# Patient Record
Sex: FEMALE | Race: WHITE
[De-identification: names, ages, dates, MRNs, and addresses within clinical notes are randomized per-mention and may not be internally consistent; named-entity substitution may affect disease eponyms.]

---

## 2019-11-19 ENCOUNTER — HOSPITAL ENCOUNTER (EMERGENCY)
Dept: HOSPITAL 25 - ED | Age: 64
Discharge: HOME | End: 2019-11-19
Payer: COMMERCIAL

## 2019-11-19 VITALS — DIASTOLIC BLOOD PRESSURE: 79 MMHG | SYSTOLIC BLOOD PRESSURE: 117 MMHG

## 2019-11-19 DIAGNOSIS — R55: Primary | ICD-10-CM

## 2019-11-19 DIAGNOSIS — I10: ICD-10-CM

## 2019-11-19 DIAGNOSIS — Z88.2: ICD-10-CM

## 2019-11-19 DIAGNOSIS — E78.00: ICD-10-CM

## 2019-11-19 DIAGNOSIS — Z90.710: ICD-10-CM

## 2019-11-19 DIAGNOSIS — Z87.891: ICD-10-CM

## 2019-11-19 LAB
ALBUMIN SERPL BCG-MCNC: 4.1 G/DL (ref 3.2–5.2)
ALBUMIN/GLOB SERPL: 1.4 {RATIO} (ref 1–3)
ALP SERPL-CCNC: 50 U/L (ref 34–104)
ALT SERPL W P-5'-P-CCNC: 18 U/L (ref 7–52)
ANION GAP SERPL CALC-SCNC: 9 MMOL/L (ref 2–11)
AST SERPL-CCNC: 20 U/L (ref 13–39)
BASOPHILS # BLD AUTO: 0 10^3/UL (ref 0–0.2)
BUN SERPL-MCNC: 16 MG/DL (ref 6–24)
BUN/CREAT SERPL: 17 (ref 8–20)
CALCIUM SERPL-MCNC: 9.4 MG/DL (ref 8.6–10.3)
CHLORIDE SERPL-SCNC: 98 MMOL/L (ref 101–111)
EOSINOPHIL # BLD AUTO: 0.1 10^3/UL (ref 0–0.6)
GLOBULIN SER CALC-MCNC: 2.9 G/DL (ref 2–4)
GLUCOSE SERPL-MCNC: 122 MG/DL (ref 70–100)
HCO3 SERPL-SCNC: 25 MMOL/L (ref 22–32)
HCT VFR BLD AUTO: 38 % (ref 35–47)
HGB BLD-MCNC: 13 G/DL (ref 12–16)
INR PPP/BLD: 0.89 (ref 0.82–1.09)
LYMPHOCYTES # BLD AUTO: 1 10^3/UL (ref 1–4.8)
MAGNESIUM SERPL-MCNC: 1.7 MG/DL (ref 1.9–2.7)
MCH RBC QN AUTO: 32 PG (ref 27–31)
MCHC RBC AUTO-ENTMCNC: 34 G/DL (ref 31–36)
MCV RBC AUTO: 94 FL (ref 80–97)
MONOCYTES # BLD AUTO: 0.6 10^3/UL (ref 0–0.8)
NEUTROPHILS # BLD AUTO: 5.3 10^3/UL (ref 1.5–7.7)
NRBC # BLD AUTO: 0 10^3/UL
NRBC BLD QL AUTO: 0
PLATELET # BLD AUTO: 278 10^3/UL (ref 150–450)
POTASSIUM SERPL-SCNC: 4.2 MMOL/L (ref 3.5–5)
PROT SERPL-MCNC: 7 G/DL (ref 6.4–8.9)
RBC # BLD AUTO: 4.08 10^6 /UL (ref 3.7–4.87)
SODIUM SERPL-SCNC: 132 MMOL/L (ref 135–145)
TROPONIN I SERPL-MCNC: 0 NG/ML (ref ?–0.04)
TSH SERPL-ACNC: 2.31 MCIU/ML (ref 0.34–5.6)
WBC # BLD AUTO: 7 10^3/UL (ref 3.5–10.8)
WBC UR QL AUTO: (no result)

## 2019-11-19 PROCEDURE — 85025 COMPLETE CBC W/AUTO DIFF WBC: CPT

## 2019-11-19 PROCEDURE — 99283 EMERGENCY DEPT VISIT LOW MDM: CPT

## 2019-11-19 PROCEDURE — 84484 ASSAY OF TROPONIN QUANT: CPT

## 2019-11-19 PROCEDURE — 81015 MICROSCOPIC EXAM OF URINE: CPT

## 2019-11-19 PROCEDURE — 84443 ASSAY THYROID STIM HORMONE: CPT

## 2019-11-19 PROCEDURE — 85610 PROTHROMBIN TIME: CPT

## 2019-11-19 PROCEDURE — 80053 COMPREHEN METABOLIC PANEL: CPT

## 2019-11-19 PROCEDURE — 87077 CULTURE AEROBIC IDENTIFY: CPT

## 2019-11-19 PROCEDURE — 83735 ASSAY OF MAGNESIUM: CPT

## 2019-11-19 PROCEDURE — 83605 ASSAY OF LACTIC ACID: CPT

## 2019-11-19 PROCEDURE — 81003 URINALYSIS AUTO W/O SCOPE: CPT

## 2019-11-19 PROCEDURE — 87186 SC STD MICRODIL/AGAR DIL: CPT

## 2019-11-19 PROCEDURE — 71045 X-RAY EXAM CHEST 1 VIEW: CPT

## 2019-11-19 PROCEDURE — 83880 ASSAY OF NATRIURETIC PEPTIDE: CPT

## 2019-11-19 PROCEDURE — 93005 ELECTROCARDIOGRAM TRACING: CPT

## 2019-11-19 PROCEDURE — 87086 URINE CULTURE/COLONY COUNT: CPT

## 2019-11-19 PROCEDURE — 96361 HYDRATE IV INFUSION ADD-ON: CPT

## 2019-11-19 PROCEDURE — 36415 COLL VENOUS BLD VENIPUNCTURE: CPT

## 2019-11-19 PROCEDURE — 96375 TX/PRO/DX INJ NEW DRUG ADDON: CPT

## 2019-11-19 PROCEDURE — 96374 THER/PROPH/DIAG INJ IV PUSH: CPT

## 2019-11-19 PROCEDURE — 70450 CT HEAD/BRAIN W/O DYE: CPT

## 2019-11-19 NOTE — ED
Syncope/Near Syncope





- HPI Summary


HPI Summary: 





This patient is a 64 year old F presenting to [facility] accompanied by her daughter 

with a chief complaint of syncope since 2 hours ago. Pt was urinating on the 

toilet when she woke up on the floor. She was slurring words once she was 

awake. Pt does not have any memory of the syncope. The patient rates the pain 5/

10 in severity. Symptoms aggravated by nothing. Symptoms alleviated by nothing. 

Patient reports HA, tingling and numbness of hands and feet, leg cramps


Patient denies CP, SOB. Pt has HTN.


 





- History Of Current Complaint


Chief Complaint: EDSyncope


Time Seen by Provider: 11/19/19 02:41


Hx Obtained From: Patient


Onset/Duration: Sudden Onset, Lasting Hours - 2, Still Present


Timing: Hours - 2


Activity At Onset: Other - urinating on toilet


Associated Head Trauma: No


Aggravating Factor(s): Nothing


Alleviating Factor(s): Nothing


Associated Signs And Symptoms: Other - positive - syncope, HA, tingling and 

numbness of hands and feet, leg cramps. negative - CP, SOB.





- Allergies/Home Medications


Allergies/Adverse Reactions: 


 Allergies











Allergy/AdvReac Type Severity Reaction Status Date / Time


 


MS Sulfa Antibiotics Allergy  Hives Verified 12/30/15 10:18





[Sulfa Antibiotics]     











Home Medications: 


 Home Medications





Zoloft* 25 mg PO DAILY 11/19/19 [History Confirmed 11/19/19]











PMH/Surg Hx/FS Hx/Imm Hx


Previously Healthy: No


Endocrine/Hematology History: 


   Denies: Hx Diabetes


Cardiovascular History: Reports: Hx Angina, Hx Hypercholesterolemia, Hx 

Hypertension


   Denies: Hx Coronary Artery Disease, Hx Myocardial Infarction, Hx Pacemaker/

ICD


Respiratory History: 


   Denies: Hx Asthma, Hx Chronic Obstructive Pulmonary Disease (COPD)


 History: 


   Denies: Hx Renal Disease


Sensory History: 


   Denies: Hx Hearing Aid


Psychiatric History: 


   Denies: Hx Panic Disorder





- Cancer History


Hx Chemotherapy: No


Hx Radiation Therapy: No





- Surgical History


Surgical History: Yes


Surgery Procedure, Year, and Place: HYSTERECTOMY - PRIOR -TUBAL LIGATION.  

APPENDECTOMY


Infectious Disease History: No


Infectious Disease History: 


   Denies: Traveled Outside the [place] in Last 30 Days





- Family History


Known Family History: Positive: None





- Social History


Alcohol Use: Daily


Substance Use Type: Reports: None


Smoking Status (MU): Former Smoker





Review of Systems


Negative: Chest Pain


Negative: Shortness Of Breath


Musculoskeletal: Other - positive - leg cramps


Neurological: Other - positive - tingling and numbness of hands and feet


Positive: Headache, Syncope


All Other Systems Reviewed And Are Negative: Yes





Physical Exam





- Summary


Physical Exam Summary: 





General: Well-developed, Well-nourished FEMALE. No acute distress.


HEENT: Normocephalic, Atraumatic. 


              Eyes: Conjuctiva normal, PERRL.


              Ears: TMs within normal limits.


              Nares: (-) discharge, (-) erythema.


              Oropharynx: Clear, mucous membranes moist, (-) exudates. 


Neck: Soft, FROM, (-) lymphadenopathy, (-) thyromegaly, (-) JVD.


Cardiovascular: Normal sinus rhythm, (-) murmur.


Lungs: Clear to auscultation bilaterally (-) wheezes, (-) rales, (-) rhonchi.


Abdomen: Soft, non-tender, non-distended, (-) organomegaly, normal bowel sounds.


Back: (-) CVA tenderness


Extremities: No edema.


Skin: Warm, dry, (-) rash.


Neuro: Alert and oriented x3, no focal deficits. Mild tremors. Mild weakness of 

legs bilaterally 


Psychiatric: Mood normal, affect normal.





Triage Information Reviewed: Yes


Vital Signs On Initial Exam: 


 Initial Vitals











Temp Pulse Resp BP Pulse Ox


 


 96.6 F   61   18   147/80   98 


 


 11/19/19 02:41  11/19/19 02:41  11/19/19 02:41  11/19/19 02:41  11/19/19 02:41











Vital Signs Reviewed: Yes





Procedures





- Sedation


Patient Received Moderate/Deep Sedation with Procedure: No





Diagnostics





- Vital Signs


 Vital Signs











  Temp Pulse Resp BP Pulse Ox


 


 11/19/19 02:41  96.6 F  61  18  147/80  98














- Laboratory


Result Diagrams: 


 11/19/19 03:27





 11/19/19 03:27


Lab Statement: Any lab studies that have been ordered have been reviewed, and 

results considered in the medical decision making process.





- Radiology


  ** CXR


Radiology Interpretation Completed By: ED Physician


Summary of Radiographic Findings: Impression:  No acute changes. No infiltrates 

or pleural effusion.





- CT


  ** Brain


CT Interpretation Completed By: Radiologist


Summary of CT Findings: IMPRESSION:  No acute intracranial abnormality.  These 

findings were reviewed by Dr. Hauser.





- EKG


  ** 0352


Cardiac Rate: NL - 68 BPM


EKG Rhythm: Sinus Rhythm


Summary of EKG Findings: EKG at 0352 shows 68 BPM, sinus rhythm, no STEMI





Course/Dx


Course Of Treatment: 64-year-old female coming in after an episode of syncope 

on the bathroom toilet.  Patient also has significant nausea and abdominal 

discomfort.  Patient given IV fluids, Protonix, Zofran and then Reglan.  

Significant improvement in her symptoms.  Workup demonstrated an elevated 

lactic acid prior to fluids.  Otherwise no significant abnormality including 

chest x-ray and CT head.  Patient discharged home.  Follow up with PCP.  Rest 

and plenty of fluids.  Follow sooner for any worsening symptoms.





- Diagnoses


Provider Diagnoses: 


 Syncope








Discharge ED





- Sign-Out/Discharge


Documenting (check all that apply): Patient Departure - discharge





- Discharge Plan


Condition: Stable


Disposition: HOME


Patient Education Materials:  Syncope (ED)


Referrals: 


Jessi Dumont MD [Primary Care Provider] - 3 Days


Additional Instructions: 


Follow up with your primary care provider within 3 days. Return to the ED for 

any new or worsening symptoms.





- Billing Disposition and Condition


Condition: STABLE


Disposition: Home





- Attestation Statements


Document Initiated by Faisal: Yes


Documenting Scribe: Aguilar Gould


Provider For Whom Faisal is Documenting (Include Credential): Dr. Laura Hauser MD


Scribe Attestation: 


Aguilar LAND scribed for Dr. Laura Hauser MD on 11/22/19 at 0003. 


Scribe Documentation Reviewed: Yes


Provider Attestation: 


The documentation as recorded by the Aguilar loyd accurately reflects the 

service I personally performed and the decisions made by me, Dr. Laura Hauser MD


Status of Scribe Document: Viewed

## 2019-11-21 NOTE — ED
Imaging and Labs Follow Up


Follow Up Type: Labs/Cultures


Labs/Culture Result: 


urine culture grew Enterococcus faecalis 10-25,000 and normal carmelo 25-50,000. 





Patient Communication/Plan: 


this is likely a contaminant. no further action required. 





Provider Diagnoses: 


 Syncope